# Patient Record
Sex: MALE | Race: WHITE
[De-identification: names, ages, dates, MRNs, and addresses within clinical notes are randomized per-mention and may not be internally consistent; named-entity substitution may affect disease eponyms.]

---

## 2021-05-25 ENCOUNTER — HOSPITAL ENCOUNTER (INPATIENT)
Dept: HOSPITAL 35 - 2N | Age: 66
LOS: 4 days | Discharge: NURSING, REHAB OR LONG-TERM CARE | DRG: 871 | End: 2021-05-29
Attending: HOSPITALIST | Admitting: HOSPITALIST
Payer: COMMERCIAL

## 2021-05-25 VITALS — WEIGHT: 229.99 LBS | HEIGHT: 68 IN | BODY MASS INDEX: 34.86 KG/M2

## 2021-05-25 VITALS — SYSTOLIC BLOOD PRESSURE: 111 MMHG | DIASTOLIC BLOOD PRESSURE: 74 MMHG

## 2021-05-25 DIAGNOSIS — G81.94: ICD-10-CM

## 2021-05-25 DIAGNOSIS — A41.9: Primary | ICD-10-CM

## 2021-05-25 DIAGNOSIS — F10.20: ICD-10-CM

## 2021-05-25 DIAGNOSIS — B96.89: ICD-10-CM

## 2021-05-25 DIAGNOSIS — Z20.822: ICD-10-CM

## 2021-05-25 DIAGNOSIS — G81.91: ICD-10-CM

## 2021-05-25 DIAGNOSIS — Z60.2: ICD-10-CM

## 2021-05-25 DIAGNOSIS — E78.5: ICD-10-CM

## 2021-05-25 DIAGNOSIS — Z80.0: ICD-10-CM

## 2021-05-25 DIAGNOSIS — F20.0: ICD-10-CM

## 2021-05-25 DIAGNOSIS — N39.0: ICD-10-CM

## 2021-05-25 DIAGNOSIS — R53.81: ICD-10-CM

## 2021-05-25 DIAGNOSIS — I10: ICD-10-CM

## 2021-05-25 DIAGNOSIS — F31.9: ICD-10-CM

## 2021-05-25 DIAGNOSIS — Z80.8: ICD-10-CM

## 2021-05-25 DIAGNOSIS — Z87.891: ICD-10-CM

## 2021-05-25 DIAGNOSIS — E11.42: ICD-10-CM

## 2021-05-25 DIAGNOSIS — D69.6: ICD-10-CM

## 2021-05-25 DIAGNOSIS — M31.9: ICD-10-CM

## 2021-05-25 DIAGNOSIS — E46: ICD-10-CM

## 2021-05-25 DIAGNOSIS — J45.909: ICD-10-CM

## 2021-05-25 DIAGNOSIS — I63.233: ICD-10-CM

## 2021-05-25 PROCEDURE — 10081 I&D PILONIDAL CYST COMP: CPT

## 2021-05-25 SDOH — SOCIAL STABILITY - SOCIAL INSECURITY: PROBLEMS RELATED TO LIVING ALONE: Z60.2

## 2021-05-25 NOTE — NUR
ASSUMED CARE FROM [facility], PT ALERT ORIENTED X4 CALM FAMILY AT
BEDSIDE NIH 0 AT THIS TIME PT PLACED ON CARDIAC MONITOR SHOWS NSR. DENIES
PAIN, REQUESTED FOOD . NP FROYLAN MARTINEZ AT BEDSIDE ASSESSING AND SOON TO WRITE
ORDERS.WILL PERFORM NIH ASSESSMENT AS ORDERD AND PRN.

## 2021-05-26 VITALS — SYSTOLIC BLOOD PRESSURE: 121 MMHG | DIASTOLIC BLOOD PRESSURE: 83 MMHG

## 2021-05-26 VITALS — DIASTOLIC BLOOD PRESSURE: 72 MMHG | SYSTOLIC BLOOD PRESSURE: 145 MMHG

## 2021-05-26 VITALS — DIASTOLIC BLOOD PRESSURE: 69 MMHG | SYSTOLIC BLOOD PRESSURE: 120 MMHG

## 2021-05-26 VITALS — SYSTOLIC BLOOD PRESSURE: 154 MMHG | DIASTOLIC BLOOD PRESSURE: 83 MMHG

## 2021-05-26 LAB
ANION GAP SERPL CALC-SCNC: 14 MMOL/L (ref 7–16)
BUN SERPL-MCNC: 23 MG/DL (ref 7–18)
CALCIUM SERPL-MCNC: 8.9 MG/DL (ref 8.5–10.1)
CHLORIDE SERPL-SCNC: 103 MMOL/L (ref 98–107)
CHOLEST SERPL-MCNC: 102 MG/DL (ref ?–200)
CO2 SERPL-SCNC: 20 MMOL/L (ref 21–32)
CREAT SERPL-MCNC: 1.2 MG/DL (ref 0.7–1.3)
ERYTHROCYTE [DISTWIDTH] IN BLOOD BY AUTOMATED COUNT: 14.1 % (ref 10.5–14.5)
GLUCOSE SERPL-MCNC: 174 MG/DL (ref 74–106)
HCT VFR BLD CALC: 40.6 % (ref 42–52)
HDLC SERPL-MCNC: 33 MG/DL (ref 40–?)
HGB BLD-MCNC: 14 GM/DL (ref 14–18)
LDLC SERPL-MCNC: 50 MG/DL (ref ?–100)
MAGNESIUM SERPL-MCNC: 1.6 MG/DL (ref 1.8–2.4)
MCH RBC QN AUTO: 32.4 PG (ref 26–34)
MCHC RBC AUTO-ENTMCNC: 34.5 G/DL (ref 28–37)
MCV RBC: 93.8 FL (ref 80–100)
PLATELET # BLD: 109 THOU/UL (ref 150–400)
POTASSIUM SERPL-SCNC: 3.5 MMOL/L (ref 3.5–5.1)
RBC # BLD AUTO: 4.33 MIL/UL (ref 4.5–6)
SODIUM SERPL-SCNC: 137 MMOL/L (ref 136–145)
TC:HDL: 3.1 RATIO
TRIGL SERPL-MCNC: 99 MG/DL (ref ?–150)
VLDLC SERPL CALC-MCNC: 20 MG/DL (ref ?–40)
WBC # BLD AUTO: 10.9 THOU/UL (ref 4–11)

## 2021-05-26 NOTE — NUR
UPON INITIAL ASSESSMENT OF PT THIS AM, HE WAS SHOWN TO HAVE AN NIH SCORE OF 5
WHICH WAS NEW COMPARED TO NIGHT RN'S REPORT. PROVIDER CONTACTED IMMEDIATELY
AND A STAT CT/MRI WAS COMPLETED, NEUROLOGY CONSULTED STAT(HAS NOT SEEN PT
YET). ECHO COMPLETE WAS DONE AT [facility]. BLOOD CULTURE RESULTS WERE OBTAINED
FROM [facility] AND RESULTS GIVEN TO PROVIDER. NEW BLOOD
CULTURES WERE ALSO DRAWN. MAGNESIUM REPLACED PER [facility]. PT AND FAMILY
HAVE BEEN THOUROUGHLY UPDATED AND EDUCATED ON PT CONDITION AND POC. PT NOT
PROGRESSING TOWARDS POC.

## 2021-05-26 NOTE — NUR
Met with patient who is A/Ox4 he reports he lives in home alone. No steps to
enter and all needs on one level. Patient reports pta indepedent with adls
pta. he uses a cane in community. Sp with Jennifer Shea, She reports she
is his only family. His son commited suicide at 15 years old. Patient admitted
with stroke like symptoms. Therapy evals in process. If patient with need for
acute rehab both patient and jennifer agreeable to [facility].

## 2021-05-26 NOTE — 2DMMODE
[facility]
[place]   [address]                   2 D/M-MODE ECHOCARDIOGRAM     
_______________________________________________________________________________
 
Name:       NAM GROSS AMENA              Room #:         202-P       ADM IN  
.#:      [id number]                       Account #:      [id number]  
Admission:  21    Attend Phys:    Pedro Ramon MD 
Discharge:              Date of Birth:  [birth year]  
                                                          Report #: [id number]
                                                                    [id number]
_______________________________________________________________________________
THIS REPORT FOR:  
 
cc:  FAM - Family physician unknown
     FAM - Family physician unknown
     Jose Zapata MD [facility]                                         
                                                                       ~
 
--------------- APPROVED REPORT --------------
 
 
Study performed:  2021 13:07:51
 
EXAM: Comprehensive 2D, Doppler, and color-flow 
Echocardiogram 
Patient Location: In-Patient   
Room #:  202     Status:  routine
 
      BSA:         2.18
HR: 66 bpm BP:          121/83 mmHg 
Rhythm: NSR     
 
Other Information 
Study Quality: Fair
 
Risk Factors: 
Cardiac Risk Factors:  HTN
 
Indications
Hypertension/HDD
 
2D Dimensions
RVDd:  33.82 mm   
IVSd:  13.61 (7-11mm)  LVOT Diam:  23.29 (18-24mm) 
LVDd:  41.37 mm   
PWd:  14.83 (7-11mm)  Ascending Ao:  34.62 (22-36mm)
LVDs:  29.24 (25-40mm)  
Left Atrium:  32.00 (27-40mm) 
Aortic Root:  35.94 mm  
 
Volumes
Left Atrial Volume (Systole) 
Single Plane 4CH:  29.30 mL Single Plane 2CH:  53.90 mL
Biplane LA Volume:  48.00 mL LA ESV Index:  22.00 mL/m2
 
Aortic Valve
AoV Peak Ede.:  1.76 m/s 
 
 
[facility]
[address]
[place]  [address]
Phone:  [phone]                    2 D/M-MODE ECHOCARDIOGRAM     
_______________________________________________________________________________
 
Name:            NAM GROSS              Room #:        202-P       [facility] [place]
[facility]#:           [id number]          Account #:     [id number]  
Admission:       21         Attend Phys:   Pedro Ramon,
Discharge:                  Date of Birth: [birth year]  
                         Report #:      [id number]
        [id number]
_______________________________________________________________________________
AO Peak Gr.:  12.36 mmHg LVOT Max P.91 mmHg
    LVOT Max V:  1.31 m/s
AKIL Vmax: 3.18 cm2  
 
Mitral Valve
    E/A Ratio:  0.8
    MV Decel. Time:  330.68 ms
MV E Max Ede.:  0.48 m/s 
MV A Ede.:  0.61 m/s  
MV PHT:  95.90 ms  
MVA (PHT):  1.86 cm2  
IVRT:  32.30 ms   
 
Pulmonary Valve
PV Peak Ede.:  1.14 m/s PV Peak Gr.:  5.24 mmHg
 
Pulmonary Vein
P Vein S:    0.42 m/s P Vein A:  0.31 m/s
P Vein D:   0.62 m/s P Vein A Dur.:  101.5 msec
P Vein S/D Ratio:  0.68 
 
Tricuspid Valve
TR Peak Ede.:  2.11 m/s  RAP Estimate:  7.00 mmHg
TR Peak Gr.:  17.82 mmHg RVSP:  25.00 mmHg
 
Left Ventricle
The left ventricle is normal size. There is normal LV segmental wall 
motion. Mild concentric left ventricular hypertrophy. Left 
ventricular systolic function is normal. The left ventricular 
ejection fraction is within the normal range. LVEF is 50-55%. 
Transmitral Doppler flow pattern suggests impaired LV 
relaxation.
 
Right Ventricle
The right ventricle is normal size. The right ventricular systolic 
function is normal.
 
Atria
The left atrium size is normal. Injection of bubbles is not 
conclusive due to poor image quality. The right atrium size is 
normal.
 
Aortic Valve
The aortic valve is normal in structure. No aortic regurgitation is 
present. There is no aortic valvular stenosis.
 
 
 
[facility]
[address] [place] [address]
Phone:  [phone]                    2 D/M-MODE ECHOCARDIOGRAM     
_______________________________________________________________________________
 
Name:            NAM GROSS AMENA              Room #:        202-P       [facility] [place]
..#:           [id number]          Account #:     [id number]  
Admission:       21         Attend Phys:   Pedro Ramon,
Discharge:                  Date of Birth: [birth year]  
                         Report #:      [id number]
        [id number]
_______________________________________________________________________________
Mitral Valve
The mitral valve is normal in structure. Trace mitral regurgitation. 
No evidence of mitral valve stenosis.
 
Tricuspid Valve
The tricuspid valve is normal in structure. Trace to mild tricuspid 
regurgitation. PAP 25 mmHg
 
Pulmonic Valve
The pulmonary valve is normal in structure. There is no pulmonic 
valvular regurgitation.
 
Great Vessels
The aortic root is normal in size. IVC is normal in size and 
collapses >50% with inspiration.
 
Pericardium
There is no pericardial effusion. There is no pleural 
effusion.
 
<Conclusion>
Normal left ventricular size with mild concentric 
hypertrophy
Ejection fraction 55%
Grade 1 diastolic dysfunction
Normal right ventricular size/function
Normal atrial size
Color-flow Doppler study was performed of the 
aortic/mitral/tricuspid/pulmonary valve
Mild aortic valve calcification without stenosis
Normal mitral valve structure and function
Trace tricuspid valve insufficiency
Pulmonary systolic pressure estimated 25 mmHg
Normal aortic root size
No pericardial effusion
 
 
 
 
 
 
 
 
 
  <ELECTRONICALLY SIGNED>
   By: Jose Zapata MD, FACC    
  21     1433
D: 21 1433                           _____________________________________
T: 21 1433                           Jose Zapata MD, FACC      /INF

## 2021-05-27 VITALS — SYSTOLIC BLOOD PRESSURE: 135 MMHG | DIASTOLIC BLOOD PRESSURE: 83 MMHG

## 2021-05-27 VITALS — SYSTOLIC BLOOD PRESSURE: 117 MMHG | DIASTOLIC BLOOD PRESSURE: 75 MMHG

## 2021-05-27 VITALS — DIASTOLIC BLOOD PRESSURE: 61 MMHG | SYSTOLIC BLOOD PRESSURE: 105 MMHG

## 2021-05-27 VITALS — DIASTOLIC BLOOD PRESSURE: 83 MMHG | SYSTOLIC BLOOD PRESSURE: 168 MMHG

## 2021-05-27 VITALS — DIASTOLIC BLOOD PRESSURE: 72 MMHG | SYSTOLIC BLOOD PRESSURE: 147 MMHG

## 2021-05-27 LAB
ANION GAP SERPL CALC-SCNC: 15 MMOL/L (ref 7–16)
BILIRUB UR-MCNC: NEGATIVE MG/DL
BUN SERPL-MCNC: 24 MG/DL (ref 7–18)
CALCIUM SERPL-MCNC: 9.3 MG/DL (ref 8.5–10.1)
CHLORIDE SERPL-SCNC: 101 MMOL/L (ref 98–107)
CO2 SERPL-SCNC: 20 MMOL/L (ref 21–32)
COLOR UR: YELLOW
CREAT SERPL-MCNC: 1.2 MG/DL (ref 0.7–1.3)
ERYTHROCYTE [DISTWIDTH] IN BLOOD BY AUTOMATED COUNT: 13.2 % (ref 10.5–14.5)
EST. AVERAGE GLUCOSE BLD GHB EST-MCNC: 126 MG/DL
FINE GRAN CASTS #/AREA URNS LPF: (no result) /LPF
GLUCOSE SERPL-MCNC: 155 MG/DL (ref 74–106)
GLYCOHEMOGLOBIN (HGB A1C): 6 % (ref 4.8–5.6)
HCT VFR BLD CALC: 41.3 % (ref 42–52)
HGB BLD-MCNC: 14.1 GM/DL (ref 14–18)
KETONES UR STRIP-MCNC: (no result) MG/DL
MAGNESIUM SERPL-MCNC: 1.9 MG/DL (ref 1.8–2.4)
MCH RBC QN AUTO: 31.4 PG (ref 26–34)
MCHC RBC AUTO-ENTMCNC: 34.2 G/DL (ref 28–37)
MCV RBC: 91.7 FL (ref 80–100)
PLATELET # BLD: 101 THOU/UL (ref 150–400)
POTASSIUM SERPL-SCNC: 3.8 MMOL/L (ref 3.5–5.1)
RBC # BLD AUTO: 4.51 MIL/UL (ref 4.5–6)
RBC # UR STRIP: (no result) /UL
RBC #/AREA URNS HPF: (no result) /HPF
SODIUM SERPL-SCNC: 136 MMOL/L (ref 136–145)
SP GR UR STRIP: 1.02 (ref 1–1.03)
SQUAMOUS: (no result) /LPF (ref 0–3)
URINE CLARITY: CLEAR
URINE GLUCOSE-RANDOM*: NEGATIVE
URINE LEUKOCYTES-REFLEX: (no result)
URINE NITRITE-REFLEX: POSITIVE
URINE PROTEIN (DIPSTICK): (no result)
URINE WBC-REFLEX: (no result) /HPF (ref 0–5)
UROBILINOGEN UR STRIP-ACNC: 0.2 E.U./DL (ref 0.2–1)
WBC # BLD AUTO: 9.6 THOU/UL (ref 4–11)

## 2021-05-27 NOTE — NUR
Pt is working with therapy and f/u per neuro. [facility] acute rehab following along
and can accept once cleared medically. The pt has + blood cultures and now on
iv atb with picc line ordered today. Will follow.

## 2021-05-27 NOTE — HC
[facility]
[address]
[place]   [address]                     CONSULTATION                  
_______________________________________________________________________________
 
Name:       NAM GROSS              Room #:         202-P       ADM IN  
M.R.#:      [id number]                       Account #:      [id number]  
Admission:  21    Attend Phys:    Pedro Ramon MD 
Discharge:              Date of Birth:  [birth year]  
                                                          Report #: [id number]
                                                                    [id number] 
_______________________________________________________________________________
THIS REPORT FOR:  
 
cc:  FAM - Family physician unknown
     FAM - Family physician unknown                                       
     Timbo Christy DO                                        ~
 
DOC #: [id number]
TIMBO Christy DO
DATE OF SERVICE: 2021
 
PRIMARY ATTENDING:  Pedro Ramon MD
 
CONSULTING PSYCHIATRIST:  Timbo Christy DO
 
REASON FOR CONSULTATION:  Question of bipolar disorder psychiatric management in
setting of the patient admitted for acute stroke.
 
SOURCE OF INFORMATION:  Chair-side interview with the patient, conversations 
with Dr. Ramon and Dr. Murphy about the case.  Telephone conversation with 
his niece and healthcare Audrey SANTIAGO.
 
CHIEF COMPLAINT:  Unspecified.
 
HISTORY OF PRESENT ILLNESS:  This is a 66-year-old obese  male who was 
transferred yesterday from [facility] after experiencing a fall.  His
niece and nephew were at bedside to help report.
 
The fall happened approximately 9:30 a.m., but they did not call for help until 
11:45 a.m.  Niece sent over an ambulance at that point.   He refused care and 
then she presented to his residence where he was showing signs of a stroke with 
left-sided weakness in the upper and lower extremities, slurred speech and 
altered mental status.  He reports that he fell out of bed this morning, 
ambulated with weakness and dizziness, denies hitting his head or any other 
injuries.  Reports having difficulty using the phone to call for help.  At [facility], he had a CT of his head, which showed diffuse cerebral atrophy 
and evidence of an old right parietal occipital infarct, patchy decreased 
density in the cerebral white matter, especially on the right with possible 
acute subacute infarct on the right.  It is not entirely excluded, made a 
followup CT of the head and neck, which showed complete occlusion of the right 
internal carotid artery.  The carotid bifurcation approximately 50% stenosis at 
the origin of the left internal carotid artery, and this is completely patent, 
noted good opacification of the anterior, middle and posterior cerebral arteries
bilaterally indicating cross filling right to left via intact [place] of [place]. 
Additionally, he was found to have a urinary tract infection and was treated 
with Rocephin, gentamicin and tobramycin.
 
 
 
 
[address]   [address]                     CONSULTATION                  
_______________________________________________________________________________
 
Name:       NAM GROSS              Room #:         202-P       [facility] [place]  
[facility]#:      [id number]                       Account #:      [id number]  
Admission:  21    Attend Phys:    Pedro Ramon MD 
Discharge:              Date of Birth:  [birth year]  
                                                          Report #: [id number]
                                                                    [id number] 
_______________________________________________________________________________
 
PAST MEDICAL HISTORY:  Diabetes type 2 and hyperlipidemia.  Hypertension, 
alcoholism, peripheral neuropathy.
 
PAST PSYCHIATRIC HISTORY:  Included bipolar disorder and schizophrenia.
 
PAST SURGICAL HISTORY:  Tonsillectomy.
 
SOCIAL HISTORY:  Former alcoholic, sober one year, the niece moved down from 
[place] a year ago and she states that he has been sober on naltrexone.  Current 
everyday smoker, smokes half pack of cigarettes a day with a 50-pack-year 
history of smoking.  Denies recreational drug use.  Lives in independent living.
 
FAMILY HISTORY:  Positive for colon and liver cancer.
 
REVIEW OF SYSTEM:  Hospitalist noted a 12-point review of systems was negative 
except for the left-sided weakness.  On interview at bedside, the patient denied
SI, HI, auditory, visual, or tactile hallucinations. 
 
 He reports that the 
Vraylar has been a psychiatric medication.  He told me he saw a psychiatric 
provider.  His niece clarified this is her primary care provider.
 
ADDITIONAL SOCIAL HISTORY:  His wife is living, but has an early onset dementia.
 She resides in [place].  He reports, however, he had a son  when he was young 
at 28 years of age of suicide.  The patient reports he has been retired about 15
years.
 
CURRENT MEDICATIONS:  In the hospital, Lovenox 40 mg subQ at bedtime, aspirin 81
mg oral daily, propranolol 20 mg oral b.i.d., naltrexone 50 mg daily, 40 mg p.o.
daily lisinoopril, famotidine 20 mg daily, Neurontin 100 mg oral 3 times a day. 
He is getting Rocephin 1 gram IV q. 24 hours.
 
MICROBIOLOGY:  This admission, 2 blood cultures were done.  They are pending.
 
LABORATORY DATA:  Here at [facility]:  H and H 14.0 and 40.6, white 
count 10.9, platelet count 109.  Chemistries showed sodium 137, potassium 3.5, 
chloride 103, bicarbonate 20, anion gap 14, BUN 23, creatinine 1.2, estimated 
GFR 60, glucose 174, calcium 8.9, magnesium 1.6, triglycerides 99, cholesterol 
102, LDL 50, HDL 33, TSH 0.834.
 
DIAGNOSTIC DATA:  MRI here at [facility] showed acute CVA involving high right 
frontal region involving the posterior right parietal region, age-related 
findings include mild cerebral and cerebellar volume loss and mild chronic 
central white matter microvascular ischemia read by Dr. Forte.
 
 
 
 
[address]   [address]                     CONSULTATION                  
_______________________________________________________________________________
 
Name:       NAM GROSS              Room #:         202-P       [facility] [place]  
[facility]#:      [id number]                       Account #:      [id number]  
Admission:  21    Attend Phys:    Pedro Ramon MD 
Discharge:              Date of Birth:  [birth year]  
                                                          Report #: [id number]
                                                                    [id number] 
_______________________________________________________________________________
 
PHYSICAL EXAMINATION:
VITAL SIGNS:  Temperature 39.3, pulse 71, respirations 16, /70, O2 sat 
95%.
MUSCULOSKELETAL:  Seated in chair at bedside, reports left-sided weakness, 
hooked up to IV, did not appear in pain, did appear somewhat exhausted.
 
MENTAL STATUS EXAMINATION:  This is a well-developed, obese  male 
appearing slightly older than stated age.  Attention fair.  Concentration fair. 
Speech normal rate, volume, tone.  Thought process, linear and goal directed.  
Thought content, focused on his present somatic state. mood/affect-
congruent, constricted
Denied SI, HI.  Denied
auditory, visual, or tactile hallucinations.  Denied hopelessness, helplessness.
 Memory not formally tested.  Insight limited.  Judgment limited.  Fund of 
knowledge average range.
 
FORMULATION AND PLAN:  A 66-year-old  male presenting with acute 
stroke, transferred from [facility].  The patient is not an operative
candidate for carotid endarterectomy.  Also, the patient is being evaluated for 
acute inpatient rehabilitation.
 
DIAGNOSES:  At this time, bipolar 1 disorder by history, substance use disorder 
for alcohol at least moderate degree.  Recent right-sided CVA.  Numerous 
medical problems including diabetes, hypertension and obesity.
 
RECOMMENDATIONS:
1.  The patient actually did not know why he was on naltrexone, did not 
recognize he was taking it and the niece states he is on it for prevention of 
alcohol relapse and it is working.  I agree with it continuing.
2.  The patient takes Vraylar, which is cariprazine according to him 5 mg oral 
daily.  I called Dr. Ramon, related this to him. It is a non-formulary 
medication, so the niece will have to bring it in. She is visiting [facility], so 
that we will take care of that.
3.  The patient was oriented to month, year, day and date, did not know who the 
 was.  He has had a speech therapy cognitive 
evaluation with some mild to moderate deficits .  I am concerned that given 
the recent CVA, there could be a vascular dementia evolving.  Certainly, if the 
patient goes to our acute rehabilitation unit at [facility], neurobehavioral 
status exam will be included by Dr. Curry.
If not, my recommendation would be
some further testing.  I would have some concerns about the patient returning in
independent living if he is not screened appropriately for his ability to do so.
At this point, I do not see a reason why psychiatry will need to follow the 
patient further.  I think that the medical issues and physical rehabilitation 
have priority, so in summary, I would restart his cariprazine, naltrexone can 
 
 
 
[facility]
[address] [place]   [address]                     CONSULTATION                  
_______________________________________________________________________________
 
Name:       HOMARCANAM FREY              Room #:         202-P       ADM IN  
M.R.#:      [id number]                       Account #:      [id number]  
Admission:  21    Attend Phys:    Pedro Ramon MD 
Discharge:              Date of Birth:  [birth year]  
                                                          Report #: [id number]
                                                                    [id number] 
_______________________________________________________________________________
 
continue and we would screen him for potentially needing placement.
 
Time spent on this case is good 45 minutes, greater than 50% on review of 
records, coordination of care.
 
DO DANIELLE Gonzales/PINO/CHRIS
 
D: 2021 07:22 PM
T: 2021 12:23 AM
 
 
 
 
 
 
 
 
 
 
 
 
 
 
 
 
 
 
 
 
 
 
 
 
 
 
 
 
 
 
 
 
 
  <ELECTRONICALLY SIGNED>
   By: Timbo Christy DO        
  21     1032
D: 21 1822                           _____________________________________
T: 21 2323                           Timbo Christy DO          /nt

## 2021-05-27 NOTE — NUR
ASSESSMENT:
PT REMAIN ALERT AND ORIENT TIMES THREE. SLOW TO RESPOND AT TIMES. SITTING UP
IN CHAIR AT SHIFT CHANGE. WAS ASSISTED TIMES THREE TO BED FOR US TO DO ALEXANDER
CAROTIDS.  LEFT SIDE VERY WEAK. NOT RESISTANT TO GRAVITY. NIECE WAS AT THE
BEDSIDE EARLIER WITH QUESTIONS ANSWERED BY THIS RN IN REGARDS TO PT. VSS,
AFEBRILE. SR PER MONITOR. IN BED FOR NOW, SAFETY MEASURES MAINTAINED. NIH
SCORE 4-5.  TOLERATING PO INTAKE, NO INSULIN GIVEN THIS SHIFT. SLOW PROGRESS
TOWARDS DC GOALS. WILL CONTINUE TO MONITOR.

## 2021-05-27 NOTE — NUR
ASSESSMENT AS CHARTED -  MEDS AS PER MAR -  CHRISSIE DIET AND FLUIDS.  UP TO THE
CHAIR WITH THE ASSISTANCE OF PHYS THERAPY.  PT STARTED ON VANC AS ORDERED.  UA
SENT TO LAB AS ORDERED.  PT WITH NO CO'S OF PAIN OR NAUSEA.  L ARM/ LEG WEAKER
THAN RIGHT.  TO BE SEEN BY INFECTIOUS DISEASE AND ONC IN CONSULT.  PT TO MAYBE
TRANSFER TO REHAB TOMORROW.  ACCUCHECKS AS CHARTED COVERED PER [facility]SI PRN.  PT HE
NOTED TO BE IN THE 50'S THIS AFTERNOON.  PT WITH NO CO'S AT THE PRESENT TIME.

## 2021-05-28 VITALS — DIASTOLIC BLOOD PRESSURE: 65 MMHG | SYSTOLIC BLOOD PRESSURE: 107 MMHG

## 2021-05-28 VITALS — SYSTOLIC BLOOD PRESSURE: 120 MMHG | DIASTOLIC BLOOD PRESSURE: 68 MMHG

## 2021-05-28 VITALS — SYSTOLIC BLOOD PRESSURE: 145 MMHG | DIASTOLIC BLOOD PRESSURE: 87 MMHG

## 2021-05-28 VITALS — DIASTOLIC BLOOD PRESSURE: 76 MMHG | SYSTOLIC BLOOD PRESSURE: 130 MMHG

## 2021-05-28 VITALS — SYSTOLIC BLOOD PRESSURE: 137 MMHG | DIASTOLIC BLOOD PRESSURE: 80 MMHG

## 2021-05-28 LAB
ANION GAP SERPL CALC-SCNC: 13 MMOL/L (ref 7–16)
APTT BLD: 25.6 SECONDS (ref 24.5–32.8)
BUN SERPL-MCNC: 23 MG/DL (ref 7–18)
CALCIUM SERPL-MCNC: 9.2 MG/DL (ref 8.5–10.1)
CHLORIDE SERPL-SCNC: 104 MMOL/L (ref 98–107)
CO2 SERPL-SCNC: 21 MMOL/L (ref 21–32)
CREAT SERPL-MCNC: 1.1 MG/DL (ref 0.7–1.3)
ERYTHROCYTE [DISTWIDTH] IN BLOOD BY AUTOMATED COUNT: 13.8 % (ref 10.5–14.5)
FOLATE SERPL-MCNC: 5.6 NG/ML (ref 8.6–58.9)
GLUCOSE SERPL-MCNC: 123 MG/DL (ref 74–106)
HCT VFR BLD CALC: 38.9 % (ref 42–52)
HGB BLD-MCNC: 13.4 GM/DL (ref 14–18)
INR PPP: 0.99
IRON SERPL-MCNC: 22 UG/DL (ref 65–175)
MAGNESIUM SERPL-MCNC: 2 MG/DL (ref 1.8–2.4)
MCH RBC QN AUTO: 31.9 PG (ref 26–34)
MCHC RBC AUTO-ENTMCNC: 34.4 G/DL (ref 28–37)
MCV RBC: 92.6 FL (ref 80–100)
PLATELET # BLD: 119 THOU/UL (ref 150–400)
POTASSIUM SERPL-SCNC: 3.8 MMOL/L (ref 3.5–5.1)
PROTHROMBIN TIME: 10.8 SECONDS (ref 10.5–12.1)
RBC # BLD AUTO: 4.2 MIL/UL (ref 4.5–6)
SAO2 % BLD FROM PO2: 11 % (ref 20–39)
SODIUM SERPL-SCNC: 138 MMOL/L (ref 136–145)
TIBC SERPL-MCNC: 198 UG/DL (ref 250–450)
VIT B12 SERPL-MCNC: 229 PG/ML (ref 193–986)
WBC # BLD AUTO: 6.7 THOU/UL (ref 4–11)

## 2021-05-28 NOTE — NUR
ASSESSMENT AS CHARTED - MEDS AS PER MAR -  CHRISSIE DIET AND FLUIDS.  UP TO THE
CHAIR FOR MOST OF THE DAY.  SEEN BY PHYS THERAPY AND OCC THERAPY.  CT SCAN
COMPLETED THIS AM.  NO CO'S OF PAIN OR NAUSEA.  PT TO HAVE VANC TROUGH DRAWN
THIS EVEING PRIOR TO DOSE.  PT WITH NO CO'S AT THE PRESENT TIME.

## 2021-05-28 NOTE — NUR
PT IS ALERT AND OREIENTED X4. LUNGS ARE CLEAR ON ROOM AIR. NIH STROKE SCORE IS
A 4. SLIGHT TREAMORS IN HANDS. ABDOEN IS ROUND AND SOFT BOWEL
SOUNDSHYPOACTIVE. WATCHING SOME TV THIS EVENING. NEUROLGY SEEN PT THIS EVENING
AND ROUNDED ON PT . PT HAS A CONDOM CATH ON FOR URINE COLLECTION. CALL LIGHT
WITHIN REACH IF NEEDS FURHTER ASSISTANCE.

## 2021-05-28 NOTE — NUR
PT ACCEPTED TO ADMIT WHEN READY FOR DISCHARGE. ANTICIPATE WEEKEND ADMISSION
REHAB LIAISON IN [facility] [phone].

## 2021-05-28 NOTE — NUR
PER INSURANCE CO PT IS OUT OF NETWORK. COST TO PT WOULD BE 30% PLUS DEDUCTABLE
INCLUDING MEDICARE AS SECONDARY PAYER. IN NETWORK 100% COVERED AND DEDUCTABLE
IS MET. CONVEYED TO CM TO DISCUSS WITH PT. IF PT STILL WOULD LIKE TO COME TO
[facility] WILL CALL FOR AUTHORIZATION. WEEKEND REHAB LIASON IS KELSEY ALMAZAN [phone]

## 2021-05-28 NOTE — NUR
Case discussed with the care team. [facility] acute rehab has accepted the pt once
medically cleared for dc and they have a bed available this weekend if ready.
Pt with fevers, on iv atb per ID, and ongoing f/u per neuro.

## 2021-05-29 ENCOUNTER — HOSPITAL ENCOUNTER (INPATIENT)
Dept: HOSPITAL 35 - 2N | Age: 66
LOS: 13 days | Discharge: HOME HEALTH | DRG: 56 | End: 2021-06-11
Attending: PHYSICAL MEDICINE & REHABILITATION | Admitting: PHYSICAL MEDICINE & REHABILITATION
Payer: COMMERCIAL

## 2021-05-29 VITALS — DIASTOLIC BLOOD PRESSURE: 62 MMHG | SYSTOLIC BLOOD PRESSURE: 114 MMHG

## 2021-05-29 VITALS — DIASTOLIC BLOOD PRESSURE: 54 MMHG | SYSTOLIC BLOOD PRESSURE: 94 MMHG

## 2021-05-29 VITALS — WEIGHT: 230 LBS | HEIGHT: 67.99 IN | BODY MASS INDEX: 34.86 KG/M2

## 2021-05-29 VITALS — DIASTOLIC BLOOD PRESSURE: 67 MMHG | SYSTOLIC BLOOD PRESSURE: 118 MMHG

## 2021-05-29 VITALS — SYSTOLIC BLOOD PRESSURE: 146 MMHG | DIASTOLIC BLOOD PRESSURE: 89 MMHG

## 2021-05-29 DIAGNOSIS — D69.6: ICD-10-CM

## 2021-05-29 DIAGNOSIS — E53.8: ICD-10-CM

## 2021-05-29 DIAGNOSIS — F20.9: ICD-10-CM

## 2021-05-29 DIAGNOSIS — Z87.891: ICD-10-CM

## 2021-05-29 DIAGNOSIS — F41.9: ICD-10-CM

## 2021-05-29 DIAGNOSIS — Z86.16: ICD-10-CM

## 2021-05-29 DIAGNOSIS — E11.42: ICD-10-CM

## 2021-05-29 DIAGNOSIS — R13.10: ICD-10-CM

## 2021-05-29 DIAGNOSIS — B96.89: ICD-10-CM

## 2021-05-29 DIAGNOSIS — J45.909: ICD-10-CM

## 2021-05-29 DIAGNOSIS — I10: ICD-10-CM

## 2021-05-29 DIAGNOSIS — E55.9: ICD-10-CM

## 2021-05-29 DIAGNOSIS — I69.354: Primary | ICD-10-CM

## 2021-05-29 DIAGNOSIS — I65.23: ICD-10-CM

## 2021-05-29 DIAGNOSIS — Z79.899: ICD-10-CM

## 2021-05-29 DIAGNOSIS — F10.10: ICD-10-CM

## 2021-05-29 DIAGNOSIS — I63.9: ICD-10-CM

## 2021-05-29 DIAGNOSIS — E78.5: ICD-10-CM

## 2021-05-29 DIAGNOSIS — N39.0: ICD-10-CM

## 2021-05-29 DIAGNOSIS — R33.9: ICD-10-CM

## 2021-05-29 DIAGNOSIS — A41.9: ICD-10-CM

## 2021-05-29 DIAGNOSIS — F32.9: ICD-10-CM

## 2021-05-29 DIAGNOSIS — Z79.82: ICD-10-CM

## 2021-05-29 PROCEDURE — 10112: CPT

## 2021-05-29 NOTE — NUR
PT ADMITED FROM [address] [place] [address]. ADMISSION HX AND ASSESSMENT COMPLETED. VSS. CONSULT
CALLED. DR. SOTOMAYOR NOTIFIED. PT ORIENTED TO THE ROOM AND THE CALL LIGHT
SYSTEM. FALL PRECAUTION IMPLEMENTED. NO CONCERNS AT THIS TIME.

## 2021-05-29 NOTE — NUR
SLEPT MOST OF SHIFT. ASSIST TO TURN FOR COMFORT AND SKIN CARE. BRIEF NIH
SCORES 3 WITH RIGHT SIDE FACIAL DROOP AND LEFT ARM DRIFT. REMAINS A/O X4.
DENIES COMPLAINTS OF PAIN OR SHORTNESS OF AIR. SWALLOWS PILLS AND REGULAR
LIQUIDS WITH DIFFICULTY. WORKING ON GOALS AND PLAN OF CARE FOR NOC.
PROGRESSING TOWARDS GOALS FOR TRANSFER TO REHAB. CONTINUE TO ASSES CLOSELY.

## 2021-05-30 VITALS — DIASTOLIC BLOOD PRESSURE: 77 MMHG | SYSTOLIC BLOOD PRESSURE: 141 MMHG

## 2021-05-30 VITALS — DIASTOLIC BLOOD PRESSURE: 73 MMHG | SYSTOLIC BLOOD PRESSURE: 134 MMHG

## 2021-05-30 LAB
ANION GAP SERPL CALC-SCNC: 13 MMOL/L (ref 7–16)
BUN SERPL-MCNC: 22 MG/DL (ref 7–18)
CALCIUM SERPL-MCNC: 9.2 MG/DL (ref 8.5–10.1)
CHLORIDE SERPL-SCNC: 110 MMOL/L (ref 98–107)
CO2 SERPL-SCNC: 21 MMOL/L (ref 21–32)
CREAT SERPL-MCNC: 1.1 MG/DL (ref 0.7–1.3)
ERYTHROCYTE [DISTWIDTH] IN BLOOD BY AUTOMATED COUNT: 14.1 % (ref 10.5–14.5)
GLUCOSE SERPL-MCNC: 126 MG/DL (ref 74–106)
HCT VFR BLD CALC: 37.2 % (ref 42–52)
HGB BLD-MCNC: 12.4 GM/DL (ref 14–18)
MCH RBC QN AUTO: 31.4 PG (ref 26–34)
MCHC RBC AUTO-ENTMCNC: 33.5 G/DL (ref 28–37)
MCV RBC: 93.9 FL (ref 80–100)
PLATELET # BLD: 152 THOU/UL (ref 150–400)
POTASSIUM SERPL-SCNC: 3.5 MMOL/L (ref 3.5–5.1)
RBC # BLD AUTO: 3.96 MIL/UL (ref 4.5–6)
SODIUM SERPL-SCNC: 144 MMOL/L (ref 136–145)
WBC # BLD AUTO: 6.3 THOU/UL (ref 4–11)

## 2021-05-30 NOTE — HC
[facility]
Rosangela Borrero
[place]   [address]                     CONSULTATION                  
_______________________________________________________________________________
 
Name:       NAM GROSS              Room #:         202-P       [facility] [place]  
.R.#:      [id number]                       Account #:      [id number]  
Admission:  05/25/21    Attend Phys:    Pedro Ramon MD 
Discharge:  05/29/21    Date of Birth:  [birth year]  
                                                          Report #: [id number]
                                                                    [id number] 
_______________________________________________________________________________
THIS REPORT FOR:  
 
cc:  FAM - Family physician unknown
     FAM - Family physician unknown                                       
     Oswaldo Murphy MD                                            ~
 
DOC #: [id number]
Oswaldo Murphy MD
DATE OF SERVICE: 05/26/2021
 
We were asked to see he patient by the hospitalist.
 
HISTORY OF PRESENT ILLNESS:  The patient is a 66-year-old admitted in transfer 
from [facility] where he was seen for acute onset of weakness.  The patient 
states that he fell out of bed and he could not get up due to lower extremity 
weakness.  EMT was called and the patient was brought to the hospital.  After 
admission to [facility], the patient had CT angiogram.  This revealed complete 
occlusion of the right internal carotid and a 50% stenosis at the origin of the 
left internal carotid with good cross filling from across the [place].
 
Since admission to the hospital, the patient has had waxing and waning symptoms.
 When I saw the patient, he complained of some left-sided weakness.
 
PAST MEDICAL HISTORY:  Significant for diabetes mellitus, hypertension, 
dyslipidemia.  Other history includes bipolar disorder.
 
MEDICATIONS:  At home includes atorvastatin, vitamin D, fish oil, gabapentin, 
lisinopril, lorazepam, metformin, naltrexone, propranolol, Vraylar
 
ALLERGIES:  None known.
 
SOCIAL HISTORY:  The patient is a current smoker with a long history of tobacco 
use.  The patient lives in the [facility] by himself.
 
FAMILY HISTORY:  Positive for colon and liver cancer.
 
REVIEW OF SYSTEMS:  I agree with the review of systems as dictated by the 
hospitalist.
 
PHYSICAL EXAMINATION:
GENERAL:  The patient is sitting in a chair.  He is a bit drowsy, but arousable.
VITAL SIGNS:  Temperature 36.7, pulse rate 80, blood pressure 121/83, 
respiratory rate 18, O2 sat 100 on room air.
HEENT:  No scleral icterus.  No arcus.
NECK:  No mass.  I hear no bruit.
CHEST:  Clear to auscultation.
 
 
 
[address]   [address]                     CONSULTATION                  
_______________________________________________________________________________
 
Name:       NAM GROSS              Room #:         202-P       [facility] [place]  
M.R.#:      [id number]                       Account #:      [id number]  
Admission:  05/25/21    Attend Phys:    Pedro Ramon MD 
Discharge:  05/29/21    Date of Birth:  [birth year]  
                                                          Report #: [id number]
                                                                    [id number] 
_______________________________________________________________________________
 
HEART:  Rhythm regular, no murmur.
ABDOMEN:  Soft, protuberant.
EXTREMITIES:  No clubbing, cyanosis.  Trace edema bilaterally.  Radial pulses 
are 1+, popliteal pulses are 1+.
NEUROLOGIC:  The patient has weak left upper extremity and has a positive 
pronator drift sign.  In fact, he drifts on the left side before he closes his 
eyes, cannot hold his left palm up toward the ceiling spontaneously.  He has 
full motion in the right hand, but there is an intention tremor on the left 
side.  The fingers are weak, particularly in the lumbar distribution.  Lower 
extremities seems to have full motion and sensation, but I did not stand the 
patient and he does appear generally weak.
MUSCULOSKELETAL:  No bone or joint asymmetry or deformity.
SKIN:  No rash or infection.
PSYCHIATRIC:  Shows insight into problem and answers questions appropriately, 
but bit slowly.
 
ASSESSMENT AND PLAN:  The patient appears to have had some sort of a neurologic 
event.  This is more consistent with a completed stroke than with a stroke in 
evolution or transient ischemic attack, but time will tell, the occluded carotid
is certainly not a lesion that we would recommend treatment.  The contralateral 
stenosis does not appear to be hemodynamically significant, but duplex exam 
could corroborate velocity and give more insight if there is some concern.  At 
this point, the treatment may be focused on diagnosis of the deficits and 
rehabilitation.  It is certainly safe to add anticoagulation from my point of 
view, but a formal neurology consult may be advisable.
 
It is a privilege to participate in this challenging patient's care.  Thank you 
for the consult.
 
MD CAREN Hoang/MAR
 
D: 05/26/2021 12:25 PM
T: 05/26/2021 10:14 PM
 
 
 
 
 
 
 
 
 
  <ELECTRONICALLY SIGNED>
   By: Oswaldo Murphy MD            
  05/30/21     0744
D: 05/26/21 1125                           _____________________________________
T: 05/26/21 2114                           Oswaldo Murphy MD              /nt

## 2021-05-30 NOTE — NUR
PT PLEASANT AND COOPERATIVE WITH CARES. VSS. UP IN THE CHAIR THIS SHIFT. LEFT
SIDED WEAKNESS. NO CONCERNS AT THIS TIME. WILL CONTINUE WITH PLAN OF CARE.

## 2021-05-30 NOTE — NUR
ASSESSMENT:
PT REMAIN ALERT AND ORIENT TIMES THREE. FOLLOW SIMPLE COMMANDS. VSS, AFEBRILE.
CURRENTLY DENIES EAR ACHE, TYLENOL PRN IS AVAILABLE. CONDEM CATH INTACT. DK
YELLOW UO IN GAGNON BAG. . PT GIVEN 2 ORANGE JUICES PER REQUEST. RIGHT
HAND IV PATENT. SLOW PROGRESS TOWARDS DISCHARGE GOALS, WILL CONTINUE TO
MONITOR.

## 2021-05-31 VITALS — DIASTOLIC BLOOD PRESSURE: 76 MMHG | SYSTOLIC BLOOD PRESSURE: 136 MMHG

## 2021-05-31 VITALS — SYSTOLIC BLOOD PRESSURE: 145 MMHG | DIASTOLIC BLOOD PRESSURE: 76 MMHG

## 2021-05-31 NOTE — NUR
PATIENT HAS HAD A QUIET DAY. HE IS PLEASANT. UP WITH WALKER. GAIT FAIRLY
STEADY. NOW SLEEPING. WILLC ONT WITH PLAN OF CARE.

## 2021-05-31 NOTE — HC
[facility]
Rosangela Borrero
[place]   [address]                     CONSULTATION                  
_______________________________________________________________________________
 
Name:       NAM GROSS              Room #:         202-P       [facility] [place]  
M.R.#:      [id number]                       Account #:      [id number]  
Admission:  05/25/21    Attend Phys:    Pedro Ramon MD 
Discharge:  05/29/21    Date of Birth:  [birth year]  
                                                          Report #: [id number]
                                                                    [id number] 
_______________________________________________________________________________
THIS REPORT FOR:  
 
cc:  FAM - Family physician unknown
     FAM - Family physician unknown                                       
     Isaias Evans MD                                         ~
 
DOC #: [id number]
Isaias Evans MD
DATE OF SERVICE: 05/26/2021
 
HISTORY OF PRESENT ILLNESS:  This is a 66-year-old male patient on whom a 
routine consult was sent for altered mental status.  I saw this patient.  I 
talked to the nurse.  I talked to the patient.  In fact, today he looks 
reasonably clear in his mentation.  I called his niece who is listed as a 
durable power of  and she provided a history in this patient.  This 
patient looks like woke up with the weakness and neglect on the left side.  I 
reviewed the records from [facility].  It looks like that they had a 
consultation with neurologist by telemedicine and he was not found to be a TPA 
candidate because he woke up and it was more than 4.5 hours.  They had 
recommended do a CT angio and call if abnormal.  I do not know whether the 
patient was discussed with them again after the CT angio was abnormal.
 
The patient's INH here was reported as 0 according to the nurses, but the 
daytime nurse tells me that is not the case because when they took over what it 
was 5 and looks like that is weakness is going on since yesterday and time of 
onset of the stroke is day before. As I understand that vascular and 
cardiothoracic surgery has already seen this patient for a blocked artery and 
they do not plan to do anything in this patient because of the vessel is 
completely blocked on the right side.  The patient's biggest risk factor is 
stroke.
 
REVIEW OF SYSTEMS:  A 14-point review of system was carried out and is pretty 
extensive.  He has a pretty psychiatric problem.  He has paranoid schizophrenia,
history of diabetes, bipolar disorder, hyperlipidemia, schizophrenia, 
hypertension.  He used to drink very heavily until 2019 and he still smokes.  He
does not believe that he had any eye, ENT, cardiac, respiratory, GI, , 
musculoskeletal, constitutional, dermatological, hematological, and psychiatric,
throat, allergic symptom associated with present symptomatology.
 
PAST MEDICAL HISTORY:  Negative for stroke.  They were not aware of the fact 
that the patient had a right parietal stroke before.
 
FAMILY HISTORY:  Negative for early age stroke.
 
SOCIAL HISTORY:  Positive for smoking and drinking alcohol in 2019.
 
 
 
 
[facility]
[address]
[place]   [address]                     CONSULTATION                  
_______________________________________________________________________________
 
Name:       NAM GROSS              Room #:         202-P       [facility] [place]  
M.R.#:      [id number]                       Account #:      [id number]  
Admission:  05/25/21    Attend Phys:    Pedro Ramon MD 
Discharge:  05/29/21    Date of Birth:  [birth year]  
                                                          Report #: [id number]
                                                                    [id number] 
_______________________________________________________________________________
 
PHYSICAL EXAMINATION:  On examination, he is alert.  He is responsive.  He can 
tell me what month it is.  He could tell me the approximate date.  Speech looks 
intact.  His cranial nerve examination does show some slight left facial 
weakness, but the best I can tell, visual field looks intact, but that is 
difficult.  He is pretty significantly weak on the left side.  He does have some
antigravity movement.  He can tell me position sense on both sides.  Reflexes 
are somewhat diminished.  Tone looks symmetrical.  Cerebellar sign on the right 
side looks unremarkable.  I could not look at the patient's fundus.  His pulses 
looks palpable.  He has no edema, cyanosis or jaundice, no thyroid mass.  I do 
not hear any carotid bruit that much, but I cannot rule it out either because of
his thick neck.  His cardiac examination is unremarkable.  No respiratory 
difficulty was noticed.
 
His lab indicated the platelet count is somewhat on the lower side at 109.  His 
MRI was reviewed and it did confirm that he has an old and new stroke.  He was 
not on any aspirin or Plavix at home.
 
IMPRESSION:
1.  Right hemispheric recurrent stroke.
2.  Occlusion of the right carotid.
3.  Stenosis of the left carotid.
 
RECOMMENDATIONS:
1.  Combination of aspirin and Plavix for 21 days.  His platelet count is low, 
but I think we should still give him Plavix, but I will give him a dose of 300 
mg rather than 600 mg, but the 300 mg has been found to be effective.
2.  Left carotid need to be watched and naturally that Dr. Silva is already 
consulted and he is already addressing that issue.
3.  Running temperature. That need to be addressed and will defer to internist.
4.  He needs an echo.
5.  He needs 30 days event monitor as an outpatient.
6.  He needs a rehab consult.  That he will go after acute workup is complete.
 
Thank you very much for this referral and if you have any questions, please feel
free to contact me.
 
Isaias Evans MD
PK/HARSHA
 
D: 05/26/2021 04:48 PM
T: 05/26/2021 11:38 PM
 
 
  <ELECTRONICALLY SIGNED>
   By: Isaias Evans MD         
  05/31/21     2217
D: 05/26/21 1548                           _____________________________________
T: 05/26/21 2238                           Isaias Evans MD           /nt

## 2021-06-01 VITALS — DIASTOLIC BLOOD PRESSURE: 79 MMHG | SYSTOLIC BLOOD PRESSURE: 147 MMHG

## 2021-06-01 VITALS — DIASTOLIC BLOOD PRESSURE: 93 MMHG | SYSTOLIC BLOOD PRESSURE: 155 MMHG

## 2021-06-01 NOTE — NUR
PT ALERT AND ORIENTED TIMES FOUR. VSS. PT DENEIS PAIN/SOA. PT TOLERATES MEDS
AND MEALS. PT WORKS WELL WITH PT/OT. PT PROGRESSING TOWRADS POC GOALS.

## 2021-06-01 NOTE — NUR
Nutrition: pt admitted to rehab unit S/P acute Right CVA with left
hemiparesis. Received consult stating malnutrition-defer to physician. Pt with
no weight loss reported and overall good appetite. Observed > 75% lunch
consumed, average intake past few days > 60% of meals. BMI 35, obesity class
2.  On B12, vitamin D, folic acid.  Vitamin D pending. ST following for
dysphagia and need for mechanically altered diet. PMH/labs noted. Low risk

## 2021-06-01 NOTE — NUR
UP TO TOILET WITH GAIT BELT, WALKER, AND CONTACT GUARD ASSIST. EASILY SITTING
ON TOILET, WHICH HE DECIDED TO DO BECAUSE HE SPILLED ON THE FLOOR IN FRONT OF
TOILET DURING PREVIOUS TRIP. TOLERATING PILLS WITH WATER. BLOOD SUGAR 129 AT
HS, TAKES METFORMIN BID. FAMILY HERE AND CURIOUS ABOUT POSSIBLE BLOOD
INFECTION AND WHAT CHOICES HE CAN HAVE FOR MEALS.

## 2021-06-01 NOTE — HC
[facility]
Rosangela Borrero
[place]   [address]                     CONSULTATION                  
_______________________________________________________________________________
 
Name:       HOMARALBANNAM              Room #:         202-P       [facility] [place]  
M.R.#:      [id number]                       Account #:      [id number]  
Admission:  21    Attend Phys:    Pedro Ramon MD 
Discharge:  21    Date of Birth:  [birth year]  
                                                          Report #: [id number]
                                                                    [id number] 
_______________________________________________________________________________
THIS REPORT FOR:  
 
cc:  FAM - Family physician unknown
     FAM - Family physician unknown                                       
     Julio Finn MD                                  ~
 
DOC #: [id number]
 
cc:     Isaias Evans MD, Dr. Laverne Cheung, Pedro Ramon MD, Reuben Sanabria MD, Jami Farris, IZZY Finn MD
DATE OF SERVICE: 2021
 
REASON FOR CONSULTATION:  Thrombocytopenia.
 
HISTORY OF PRESENT ILLNESS:  The patient is a pleasant 66-year-old gentleman 
from the [facility] who reportedly the morning of admission on the , but it
may have been on the  I am not sure of the date, had fallen down and in 
retrospect had some left-sided weakness, had some altered mental status and 
trouble speaking.  He recently declined EMT evaluation, but reported his niece, 
Audrey Shea whose phone number is [phone] came over noticed the 
altered changes and he went to hospitals.  He was then transferred here.  
His imaging has showed some vascular abnormalities contributing to some 
right-sided brain damage.  He has also had evidence of old right infarct in the 
parietal he has gotten some better.  He is thought to be outside the timeframe 
of benefit from TPA.  Dr. Evans from Neurology had seen the patient, was 
planning on dual antiplatelet therapy, but was concerned about the safety given 
that his platelets were 109,000 on admission.  The patient has no prior 
knowledge of low platelet counts.  Audrey Shea, his legal guardian of 2 
years, has no knowledge of that either.  They have been stable and actually up 
to 119 today.  His white count has been around 10-9 with a fairly normal 
differential, hemoglobin has been stable during the same time.  Electrolytes 
have been fairly unremarkable.
 
At this time, the patient tells me he did have a small headache earlier today, 
better after he took a pill for it, not sure what it was.  He denies any 
swallowing troubles.  He does feel like his speech is a bit slow, thinking 
somewhat slow.  He does feel like he is a bit weak on his left side, but better.
 He does not feel like he is biting his tongue, having trouble swallowing or 
breathing.  He does not feel like he is having trouble with incontinence of 
urine or stool.  He is not aware of any skin rashes.  His appetite has been 
good.
 
PAST HISTORY:  Notable for history of alcoholism.  His niece said that he 
stopped drinking when she moved him here 2 years ago, also reports that he has 
been on ____.  She did say she caught him with a 6-pack of beer about a year 
 
 
 
[address]   [address]                     CONSULTATION                  
_______________________________________________________________________________
 
Name:       NAM GROSS              Room #:         202-P       [facility] [place]  
.R.#:      [id number]                       Account #:      [id number]  
Admission:  21    Attend Phys:    Pedro Ramon MD 
Discharge:  21    Date of Birth:  [birth year]  
                                                          Report #: [id number]
                                                                    [id number] 
_______________________________________________________________________________
 
ago.  She does not think he has been drinking since then.  Also reported history
of diabetes mellitus, hyperlipidemia, hypertension, peripheral neuropathy, 
possible carotid artery disease.  Also, there is a question of asthma, also a 
question of bipolar disorder, though that is not clear.
 
SOCIAL HISTORY:  He grew up in [place] and was in the  for about 16 
years, then he worked in [place], [place], if I recall and worked at 
[employer] as a  there for quite a few years.
 
PSYCHIATRIC:  He drank with a bunch of his buddies, I think that someone said 
his wife  about 16 years ago.  He currently lives in an apartment in [place].
 He does drive around town.  Reportedly, no drugs.  Still smokes maybe about 
half a pack a day for 50 years.  No street drugs.  His hobbies enjoys doing 
leather work.  He gets products online from [employer].  He does belts and billfolds 
and things of that type.  He also goes to the coffee shop and things like that.
 
PHYSICAL EXAMINATION:
GENERAL:  The patient appears his stated age.
VITAL SIGNS:  Height is 5 feet 8 inches, 172.7 cm, weight 233 pounds or 105.7 
kilograms.  Temperature was febrile at 100.3 axillary, O2 sat 96%, blood 
pressure 145/87, respirations 18, pulse 63.
HEENT:  Face appears symmetrical.
LUNGS:  Clear without notable rhonchi, wheezes or rales, is symmetric and 
unlabored.
ABDOMEN:  Obese.  No definite organomegaly or fluid wave.
EXTREMITIES:  Without clubbing, cyanosis.  Oropharynx without any petechiae or 
bleeding.
NEUROLOGIC:  Strength reported as slightly weak, though not assessed directly.
 
LABORATORY DATA:  As mentioned above showed the hemoglobin of 14, white count of
96, platelets 119.  Creatinine 1.2.  B12, I think was at the low end of normal 
229.  Folate was low at 5.6.  Imaging was consistent with right internal carotid
abnormalities and also, I think that might have been completely occluded.  Also,
partial occlusion of the left carotid.  As mentioned above, Vascular Surgery did
not think surgical intervention was warranted at this time.  Note the urine from
Danielle has grown gram-positive cocci.
 
MEDICATIONS:  At this time in the hospital currently include folic acid 1 mg 
began today, B12 500 mcg daily began today, clopidogrel 75 mg begun on the ,
vancomycin 1000 mg daily, Lovenox 40 at bedtime, Vraylar ____ mg daily, aspirin 
81 mg daily, naltrexone 50 mg daily, propranolol 20 b.i.d., famotidine 20 
b.i.d., sliding scale insulin, gabapentin 100 t.i.d., ceftriaxone 1 gram.  He is
also on p.r.n. Tylenol.
 
ASSESSMENT AND PLAN:
 
 
 
[address]   [address]                     CONSULTATION                  
_______________________________________________________________________________
 
Name:       NAM GROSS              Room #:         202-P       [facility] [place]  
.R.#:      [id number]                       Account #:      [id number]  
Admission:  21    Attend Phys:    Pedro Ramon MD 
Discharge:  21    Date of Birth:  [birth year]  
                                                          Report #: [id number]
                                                                    [id number] 
_______________________________________________________________________________
 
1.  Thrombocytopenia.  The patient with no prior knowledge, stable here, may be 
related to past alcohol.  We will check iron panel.  We will also ask for 
outside lab from Laverne Cheung to see if this has been stable.  We will also
check CT abdomen to see if he has any evidence of cirrhosis or liver damage from
past alcohol misuse, watch expectantly.  We will make the coags normal.
2.  History of alcohol misuse, reportedly drive for at least a year, if not 2 
years now.  The patient on ____ which reportedly has been helping him.
3.  Left-sided weakness and right-sided vascular abnormalities.  The patient on 
clopidogrel and aspirin.  I believe at this time, the potential benefit of 
discharge outweighs a small but real risk with a platelet count of 119,000.
4.  Diabetes.  Sliding scale insulin and dietary management.
5.  Hyperlipidemia.  Defer to others.
6.  Urinary tract infection with gram-positive cocci, antibiotics per Infectious
Disease.
7.  Hypertension, management per others.
8.  History of peripheral neuropathy, continues gabapentin.
9.  History of bipolar and other cognitive impairments.  Defer to others.
 
We will follow with you.
 
MD DAWN Sun/ROMARIO/ANTONIO
 
D: 2021 08:06 AM
T: 2021 09:27 AM
 
 
 
 
 
 
 
 
 
 
 
 
 
 
 
 
 
 
  <ELECTRONICALLY SIGNED>
   By: Julio Finn MD  
  21     0714
D: 21 0706                           _____________________________________
T: 21 0827                           Julio Finn MD    /nt

## 2021-06-01 NOTE — NUR
team meeting, BPCI. left side affect, mod dressing, fww with assist, mod
cog, sever memory, needs assist with bills and pills. no driving. re team with
anticipated dc on 6/16. see if niece can provided extra assist and support.
chart review. he lives alone, no steps. has cane. manage own bills and pills.

## 2021-06-02 VITALS — SYSTOLIC BLOOD PRESSURE: 154 MMHG | DIASTOLIC BLOOD PRESSURE: 88 MMHG

## 2021-06-02 VITALS — SYSTOLIC BLOOD PRESSURE: 155 MMHG | DIASTOLIC BLOOD PRESSURE: 90 MMHG

## 2021-06-02 VITALS — SYSTOLIC BLOOD PRESSURE: 124 MMHG | DIASTOLIC BLOOD PRESSURE: 66 MMHG

## 2021-06-02 VITALS — DIASTOLIC BLOOD PRESSURE: 67 MMHG | SYSTOLIC BLOOD PRESSURE: 120 MMHG

## 2021-06-02 VITALS — DIASTOLIC BLOOD PRESSURE: 74 MMHG | SYSTOLIC BLOOD PRESSURE: 143 MMHG

## 2021-06-02 VITALS — DIASTOLIC BLOOD PRESSURE: 66 MMHG | SYSTOLIC BLOOD PRESSURE: 124 MMHG

## 2021-06-02 NOTE — NUR
PT LYING IN BED, PT DIDN'T WANT TO EAT MUCH FOR DINNER, PT DID HAVE HIS
PUDDING AND A FEW BITES OF MEAT.

## 2021-06-02 NOTE — NUR
ASSISTED PT TO BATHROOM AND WHILE PT WAS WIPPING AFTER BM HE WAS LEANING TO
HIS LEFT AND FELL OFF TOILET IN SHOWER. PT DENIES HITTING HIS HEAD OR
DIZZINESS, PT STATED HE JUST LEANED TO WIPE AND FELL OFF TOILET. PT DIDN'T
APPEAR TO HAVE ANY INJURIES. VSS. PT ASSISTED BACK TO BED VIA WALKER. PT DID
HAVE SOFT BM.

## 2021-06-02 NOTE — NUR
PT LEANING ON HIS TABLE IN HIS ROOM. PT STATED HE WAS JUST TIRED. APPLIED LAB
SHARMILA AROUND HIM WITH VELCRO IN FRONT. PT ABLE IT REMOVE IF NEEDED. PT TOOK
MEDS A FEW AT A TIME WITH WATER.

## 2021-06-02 NOTE — NUR
PT WAS WORKING WITH ST THIS AM. WHEN GOING INTO ROOM FOR AM MEDS PT WAS
ALREADY RESTING WITH EYES CLOSED. NOTICED [place] GAGNON IN BATHROOM THAT HE PROB
HAD AT HS, PT HAS URINAL AT BEDSIDE. PT DIDN'T HAVE ANY ISSUES WITH BREATHING
WHILE SLEEPING.

## 2021-06-02 NOTE — NUR
UP TO TOILET WITH GAIT BELT, WALKER, AND CONTACT GUARD ASSIST. TAKING MEDS ONE
OR TWO AT A TIME WITH WATER. BLOOD SUGAR STEADY AT 97 EVENING AND HS. EASILY
TOLERATES TAKING PILLS ONE AT A TIME WITH WATER. TURNING SELF TO SIDE AND HAS
JUST ENOUGH STRENGTH TO PULL HIMSELF UP IN BED WHEN BED FLATTENED

## 2021-06-03 VITALS — DIASTOLIC BLOOD PRESSURE: 74 MMHG | SYSTOLIC BLOOD PRESSURE: 129 MMHG

## 2021-06-03 VITALS — DIASTOLIC BLOOD PRESSURE: 69 MMHG | SYSTOLIC BLOOD PRESSURE: 135 MMHG

## 2021-06-03 NOTE — NUR
6-2-21 CARE TRANSFERRED 1900 OBSERVED PT RESTING IN BED WITH EYES CLOSED.
LATER PT AAOX4, VSS, RR EVEN AND NONLABORED ON RA, PT LUNGS CLEAR AND
DIMINISHED, HT RR, ABD ACTIVE AND SOFT. PT HAS BEEN USING URINAL WITH NO
DIFFICULTIES 750ML YELLOW, SLIGHT SEDIMENT AND STRONG ODOR. DURING MEDICATION
ADMIN PT WAS ABLE TO REPOSITION SELF FROM LAYING TO SITTING, WITH NO
DIFFICULTIES, PT HAD NO DIFFICULTIES TAKING MEDICATION ONE AT A TIME WITH
WATER. LATER PT WAS REPOSITION FOR COMFORT. ZERO S/S OF ACUTE DISTRESS NOTED,
PT WILL CONTINUE TO BE MONITOR PER 5NR PROTOCOL.

## 2021-06-03 NOTE — NUR
Alert and orientated X4.  Calm, cooperative and compliant.  Took meds whole
with water.  Breath sounds clear.  Reg HR auscultated.  Color pink with brisk
capillary refill and palpable peripheral pulses.  Yellow urine per toilet.
Active bowel sounds over soft, rounded abdomen.  Reports BM yesterday.
Ambulates with walker with steady gait.  BG changed to BID per order.

## 2021-06-04 VITALS — SYSTOLIC BLOOD PRESSURE: 120 MMHG | DIASTOLIC BLOOD PRESSURE: 77 MMHG

## 2021-06-04 VITALS — DIASTOLIC BLOOD PRESSURE: 48 MMHG | SYSTOLIC BLOOD PRESSURE: 98 MMHG

## 2021-06-04 NOTE — NUR
Assumed care on 06/03/21 @ 19:15, asleep at the beginning of shift. Takes meds
whole with water.  Breath sounds clear bilat, HRRR, Active bowel sounds 4Q.
Ambulates with walker and gait belt x1 assist, Carter score 85, safety
precautions observed. Urine output via Toilet,
yellow urine noted. Respirations even and unlabored on room air.
Resting in bed with eyes closed, insists on leaving overhead
lights turned on during the night.

## 2021-06-04 NOTE — NUR
ASSUMED CARE AT 0700. SLEPT FAIRLY WELL. ALERT AND ORIENTATED X 4. WAS GIVEN
PAIN MEDS AT EARLIER IN THE SHIFT FOR L KNEE PAIN AND REPORTED RELIEF. UP WITH
MIN ASSIST USING THE CANE. BLOOD SUGAR CHECKED. APPETITE GOOD. DIURESING ADEQ.
LAST BM ON 6/2. PARTICIPATING WITH THERAPY AND PROGRESSING TOWARDS GOAL.

## 2021-06-05 VITALS — SYSTOLIC BLOOD PRESSURE: 145 MMHG | DIASTOLIC BLOOD PRESSURE: 76 MMHG

## 2021-06-05 VITALS — DIASTOLIC BLOOD PRESSURE: 53 MMHG | SYSTOLIC BLOOD PRESSURE: 116 MMHG

## 2021-06-05 NOTE — NUR
PT SITTING ON SIDE OF BED EATING BREAKFAST. PT HAS WEAKNESS TO LEFT HAND. PT
UP WITH ASSIST WITH WALKER. PT DENIES ANY PAIN. PT LUNGS CLEAR. PT LBM 06/04.
PT LUNGS ARE CLEAR. PT TAKES MEDS WHOLE. PT ALSO HAS ISSUES WITH TREMORS HE
STATED HE HAS HAD A WHILE.

## 2021-06-05 NOTE — NUR
PT ASSESSMENT COMPLETED AND VSS. MEDS GIVEN AS ORDERED AND WELL TOLERATED.
FALL PRECUATIONS IN PLACE. ASST WITH REPOSITION FOR COMFORT. SLEEPING WELL.
WILL CONTINUE TO MONITOR FREQUENTLY.

## 2021-06-06 VITALS — SYSTOLIC BLOOD PRESSURE: 97 MMHG | DIASTOLIC BLOOD PRESSURE: 49 MMHG

## 2021-06-06 VITALS — SYSTOLIC BLOOD PRESSURE: 121 MMHG | DIASTOLIC BLOOD PRESSURE: 59 MMHG

## 2021-06-06 NOTE — NUR
PT ASSESSMENT COMPLETED AND VSS. MEDS GIVEN AS ORDERED AND WELL TOLERATED.
FALL PRECAUTIONS IN PLACE. UP TO THE BATHROOM WITH ASST/GAIT/WALKER. STEADY.
VOIDING MODERATE AMOUNT OF YELLOW URINE. TYLENOL HELPFUL FOR GENERALIZED PAIN.
SLEEPING WELL. WILL CONTINUE TO MONITOR FREQUENTLY.

## 2021-06-06 NOTE — NUR
PT SITTING ON SIDE OF BED EATING BREAKFAST. PT STATED HE DIDN'T HAVE ANY
CONCERNS. PT ASKING WHAT DAY THE 16 IS. PT STATED THAT IS HIS DISCHARGE DATE.
PT STATED HE FEELS LIKE HIS LEFT HAND IS GETTING STRONGER. PT UP WITH WALKER
WITH STAND-BY ASSIST. PT LUNGS CLEAR. PT DENIES ANY PAIN AT THIS TIME.

## 2021-06-07 VITALS — DIASTOLIC BLOOD PRESSURE: 63 MMHG | SYSTOLIC BLOOD PRESSURE: 117 MMHG

## 2021-06-07 VITALS — DIASTOLIC BLOOD PRESSURE: 76 MMHG | SYSTOLIC BLOOD PRESSURE: 133 MMHG

## 2021-06-07 LAB
ANION GAP SERPL CALC-SCNC: 14 MMOL/L (ref 7–16)
BASOPHILS NFR BLD AUTO: 0.5 % (ref 0–2)
BUN SERPL-MCNC: 12 MG/DL (ref 7–18)
CALCIUM SERPL-MCNC: 8.8 MG/DL (ref 8.5–10.1)
CHLORIDE SERPL-SCNC: 108 MMOL/L (ref 98–107)
CO2 SERPL-SCNC: 23 MMOL/L (ref 21–32)
CREAT SERPL-MCNC: 1 MG/DL (ref 0.7–1.3)
EOSINOPHIL NFR BLD: 2.3 % (ref 0–3)
ERYTHROCYTE [DISTWIDTH] IN BLOOD BY AUTOMATED COUNT: 13.6 % (ref 10.5–14.5)
GLUCOSE SERPL-MCNC: 103 MG/DL (ref 74–106)
GRANULOCYTES NFR BLD MANUAL: 68 % (ref 36–66)
HCT VFR BLD CALC: 35.5 % (ref 42–52)
HGB BLD-MCNC: 12.2 GM/DL (ref 14–18)
LYMPHOCYTES NFR BLD AUTO: 20.8 % (ref 24–44)
MAGNESIUM SERPL-MCNC: 1.6 MG/DL (ref 1.8–2.4)
MCH RBC QN AUTO: 31.6 PG (ref 26–34)
MCHC RBC AUTO-ENTMCNC: 34.4 G/DL (ref 28–37)
MCV RBC: 91.9 FL (ref 80–100)
MONOCYTES NFR BLD: 8.4 % (ref 1–8)
NEUTROPHILS # BLD: 5.9 THOU/UL (ref 1.4–8.2)
PLATELET # BLD: 289 THOU/UL (ref 150–400)
POTASSIUM SERPL-SCNC: 3.6 MMOL/L (ref 3.5–5.1)
RBC # BLD AUTO: 3.86 MIL/UL (ref 4.5–6)
SODIUM SERPL-SCNC: 145 MMOL/L (ref 136–145)
WBC # BLD AUTO: 8.6 THOU/UL (ref 4–11)

## 2021-06-07 NOTE — NUR
ASSUMED CARE AT 0700. SLEPT WELL. ALERT AND ORIENTATED X 4. FLAT AFFECT.
DENIES ANY PAIN OR DISCOMFORT. APPETITE GOOD, HAD A BM TODAY. ON ROOM AIR,
DENIES ANY SHORT OF AIR. PARTICIPATING IN THERAPY AND PROGRESSING TOWARDS
GOAL.

## 2021-06-07 NOTE — NUR
PT ALERT AND ORIENTED X 4.  SLIGHT WEAKNESS IN LEFT HAND.  PT C/O HEADACHE.
TYLENOL GIVEN AT HS. BED ALARM ON FOR SAFETY.  PT APPEARS TO BE SLEEPING ON
HOURLY ROUNDS.

## 2021-06-08 VITALS — DIASTOLIC BLOOD PRESSURE: 61 MMHG | SYSTOLIC BLOOD PRESSURE: 132 MMHG

## 2021-06-08 VITALS — DIASTOLIC BLOOD PRESSURE: 74 MMHG | SYSTOLIC BLOOD PRESSURE: 138 MMHG

## 2021-06-08 NOTE — NUR
FAXED REFERRAL TO [facility]. CONFIRMED WITH ZACH/INTAKE THAT
THEY PROVIDE SERVICES TO [place] ([facility]) AND THAT THEY CAN ACCEPT
THE PATIENT AT DISCHARGE. PATIENT ANTICIPATED DISCHARGE IS 6/11/21 AND
HOME HEALTH SERVICES NEEDED ARE NURSING, OT, PT, ST AND SOCIAL WORK.
[facility] P [phone]; [phone]

## 2021-06-08 NOTE — NUR
Team meeting, recommendation: moderate cognitive and sever memory deficits.
needs assist with medication. Neice to cont. support with finances. dc 6/11 hh
( pt, ot, st, nursing). has cane, no other dme needs. Neice to check in on
Bob at home.  life alert.  [facility], referral to be sent to [facility] to see if they
can accept for hh needs. deb hh doesn't go to his area in [place].

## 2021-06-08 NOTE — NUR
ASSUMED CARE OF PT AT 1925. PT IS A&OX3. IS ON ROOM AIR. NANCY PAIN. IS
STABLE. IS UP WITH 1 ASSIST, GB, CANE. FALL PRECAUTIONS & HOURLY ROUNDING
CONTINUED THIS SHIFT. IS ABLE TO TURN SELF IN BED. PT REFUSED TO CHANGE
CLOTHES IN TO PJS. EDUCATION PROVIDED. LABS & VITALS REVIEWED. PT IS CURRENTLY
IN BED ASLEEP. CALL LIGHT WITHIN REACH. WILL CONTINUE TO MONITOR.

## 2021-06-08 NOTE — NUR
PT ASSESSMENT COMPLETED AND VSS. MEDS GIVEN AS ORDERED AND WELL TOLERATED.
FALL PRECAUTIONS IN PLACE. PT UP TO THE BATHROOM WITH ASST/GAIT/WALKER.
STEADY. VOIDING MODERATE AMOUNT OF YELLOW URINE. PRN TYLENOL HELPFUL FOR
GENERALIZED PAIN. ASST WITH REPOSITION FOR COMFORT. SLEEPING WELL. WILL
CONTINUE TO MONITOR FREQUENTLY.

## 2021-06-08 NOTE — NUR
Nutrition followup: pt continues on rehab unit S/P CVA. ST following and pt
continues to require mechanically altered chopped diet/carb controlled. BG
controlled. Pt continues to report good appetite, variable amounts recorded
but overall > 60% of meals. BM 6/7. Vitamin D deficiency. On vitamin D, B12
and folic acid. No new weight to assess. Continue low nutrition risk.

## 2021-06-08 NOTE — NUR
ASSUMED CARE AT 0700. SLEPT WELL LAST NIGHT. ALERT AND ORIENTATED X 4. FLAT
AFFECT. DENIES ANY PAIN THIS MORNING. WAS GIVEN TYLENOL LAST NIGHT. APPETITE
GOOD, BLOOD SUGAR 114 ON METFORMIN. LAST BM 6/7. PARTICIPATING WITH THERAPY
AND PROGRESSING TOWARDS GOAL. CONT TO MONITOR.

## 2021-06-09 VITALS — SYSTOLIC BLOOD PRESSURE: 145 MMHG | DIASTOLIC BLOOD PRESSURE: 76 MMHG

## 2021-06-09 VITALS — SYSTOLIC BLOOD PRESSURE: 111 MMHG | DIASTOLIC BLOOD PRESSURE: 75 MMHG

## 2021-06-09 NOTE — NUR
PT FINISHED WITH PHYSICAL THERAPY. PT DID HAVE EPISODE OF DIZZINESS WHEN
WORKING ON THE BIKE, BP CHECKED AND /72, PULSE 80 AND SAT 93% ON ROOM
AIR. PT DID STATE HE FELT BETTER. PT LISINOPRIL HELD THIS AM. PT LUNGS CLEAR
AND ON ROOM AIR. PT UP WITH WALKER AND STAND-BY ASSIST. PT TOOK MEDS WHOLE
WITH WATER.

## 2021-06-09 NOTE — PLAN
[facility]
[address]   [address]                     REHAB UNIT PLAN OF CARE       
_______________________________________________________________________________
 
Name:       NAM GROSS              Room #:         514-P       ADM IN  
M.R.#:      [id number]                       Account #:      [id number]  
Admission:  05/29/21    Attend Phys:    Gomez López MD 
Discharge:              Date of Birth:  [birth year]  
                                                          Report #: [id number]
                                                                    [id number] 
_______________________________________________________________________________
THIS REPORT FOR:  
 
cc:  FAM - Family physician unknown
     FAM - Family physician unknown                                       
     Gomez López MD                                         ~
 
DOC #: [id number]
Gomez López MD
 
PROGRESS NOTE/OVERALL PLAN OF CARE
 
HISTORY OF PRESENT ILLNESS:  The patient is seen on the acute inpatient 
rehabilitation [facility]ina.  He is in no distress.  Temperature is 98.7, pulse 57, 
respirations 17, blood pressure 136/76.  He does have definite left-sided 
weakness, left upper extremity more than left lower extremity.  I would grade 
his left upper extremity strength, it is probably a grade 4 - to 3+/5, left 
lower extremity is more of a 4-/5.  He has decreased coordination with fine 
finger dexterity.  He has a left facial droop, otherwise contract with both 
eyes.  Functionally, he is transferring with min assist.  Gait is mod assist 100
feet with a front-wheeled walker.  In occupational therapy, upper body dressing 
is being evaluated with lower body dressing max assist.  Speech therapy reveals 
moderate cognitive deficits with severe memory deficits.  He is on a mechanical 
soft diet with thin liquids.
 
ASSESSMENT:
1.  Acute right frontal cerebrovascular accident with left hemiparesis.
2.  Right carotid artery occlusion.
3.  Left artery carotid stenosis.
4.  Bacteremia.
5.  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
6.  Hypertension.
7.  Diabetes mellitus type 2.
8.  Hyperlipidemia.
9.  Peripheral neuropathy.
10.  History of ethanol abuse, sober x1 year.
11.  Thrombocytopenia with hematology involved.
12.  Folic acid deficiency.
 
PLAN:
1.  The overall plan of care is based on the pre-admit screen and information 
garnered from therapy assessments.
2.  Estimated length of stay is probably at least 14 days.
3.  Medical prognosis is reasonably good.
4.  Anticipated interventions includes the interdisciplinary acute inpatient 
rehabilitation program.
5.  Anticipated functional outcomes would be for the patient to become maximally
 
 
 
[facility]
[address] [place]   [address]                     REHAB UNIT PLAN OF CARE       
_______________________________________________________________________________
 
Name:       NAM GROSS              Room #:         514-P       [facility] [place]  
[facility]#:      [id number]                       Account #:      [id number]  
Admission:  05/29/21    Attend Phys:    Gomez López MD 
Discharge:              Date of Birth:  [birth year]  
                                                          Report #: [id number]
                                                                    [id number] 
_______________________________________________________________________________
 
independent in basic transfers, mobility and ADLs as well as improvement in 
cognition and memory, so he can return back to the home setting.
6.  Discharge destination.  The patient does live in an apartment alone.  He has
a supportive niece in town who is his durable power of .
7.  Expected therapy by discipline includes PT, OT and speech 1 hour per day 
each five days a week throughout the duration of the acute inpatient 
rehabilitation stay.
 
MD GREGORIO [place]/PINO
 
D: 06/01/2021 11:50 AM
T: 06/01/2021 10:15 PM
 
 
 
 
 
 
 
 
 
 
 
 
 
 
 
 
 
 
 
 
 
 
 
 
 
 
 
 
 
 
  <ELECTRONICALLY SIGNED>
   By: Gomez López MD         
  06/09/21     1605
D: 06/01/21 1050                           _____________________________________
T: 06/01/21 2115                           Gomez López MD           /nt

## 2021-06-10 VITALS — SYSTOLIC BLOOD PRESSURE: 124 MMHG | DIASTOLIC BLOOD PRESSURE: 74 MMHG

## 2021-06-10 VITALS — DIASTOLIC BLOOD PRESSURE: 65 MMHG | SYSTOLIC BLOOD PRESSURE: 146 MMHG

## 2021-06-10 NOTE — NUR
PT ASSESSMENT COMPLETED AND VSS. MEDS GIVEN AS ORDERED AND WELL TOLERATED.
FALL PRECAUTIONS IN PLACE. UP TO THE BATHROOM WITH ASST/GAIT/WALKER. STEADY.
PT VERY EXCITED ABOUT GOING HOME IN 2 DAYS. PT DENIES NEEDS. SLEEPING WELL.
WILL CONTINUE TO MONITOR FREQUENTLY.

## 2021-06-10 NOTE — NUR
PT. A&OX4, PT WALKING IN HALLWAY WITH P.T. PT WILLINGLY WORKS WITH THERAPIES.
PT. IS UP AD ARTEMIO WITH CANE IN ROOM. PT. SHOULD DC TOMORROW. WILL CONTINUE TO
MONITOR

## 2021-06-11 VITALS — SYSTOLIC BLOOD PRESSURE: 128 MMHG | DIASTOLIC BLOOD PRESSURE: 46 MMHG

## 2021-06-11 VITALS — DIASTOLIC BLOOD PRESSURE: 46 MMHG | SYSTOLIC BLOOD PRESSURE: 128 MMHG

## 2021-06-11 NOTE — NUR
FAXED DISCHARGE ORDERS AND SUMMARY TO [facility].
FAXED DISCHARGE ORDERS AND SUMMARY TO [facility], ATT: TANIA.
WILL CONFIRM THEY RECEIVED.
[facility] P [phone], [phone]
[facility] P [phone]; [phone]

## 2021-06-11 NOTE — NUR
ASSUMEDE CARE OF PT THIS MORNING AT 0700 THIS MORNING. PT WAS A/OX4, EYES
PERRLA, LUNGS CLEAR, SKIN W/D/ATR, INTACT TENTING ABSENT. PT IS INDEPENDENT TO
RESTROOM WITH CANE. ASSESSMENTS OTHERWISE UNREMARKABLE. CALL LIGHT AND OTHER
NEEDS ARE WITHIN REACH. MEDS AND TX GIVEN AS NEEDED AND SCHEDULED. PT IS
SCHEDULED TO BE DISCHARGED AT APPROX. 1400 HOURS THIS AFTERNOON WHEN FAMILY
MEMBER ARRIVES. PT SIGNED DISCHARGE PAPERWORK AND GIVEN EDUCATION AND
DISCHARGE MATERIAL.

## 2021-06-11 NOTE — NUR
Provider plus will deliver cane prior to dc today, Cyrus going to [facility] in [place] with melissa rivera. DC orders to be faxed. Family 
between [phone] today.

## 2021-06-13 NOTE — HC
[facility]
[address]   [address]                     CONSULTATION                  
_______________________________________________________________________________
 
Name:       NAM GROSS              Room #:         514-P       [facility] [place]  
..#:      [id number]                       Account #:      [id number]  
Admission:  21    Attend Phys:    Gomez López MD 
Discharge:  21    Date of Birth:  [birth year]  
                                                          Report #: [id number]
                                                                    [id number] 
_______________________________________________________________________________
THIS REPORT FOR:  
 
cc:  FAM - Family physician unknown
     FAM - Family physician unknown                                       
     Tee Curry PhD                                           ~
 
DOC #: [id number]
Tee Curry, PhD
DATE OF SERVICE: 2021
 
NEUROBEHAVIORAL STATUS EXAM
 
ATTENDING PHYSICIAN:  Gomez López M.D.
 
CONSULTANT:  Tee Curry, PhD
 
CLINICAL PRESENTATION:  The patient is a 66-year-old male admitted to the 
hospital on 2021 as a transfer from the [facility].  He had 
symptoms that included left hemiparesis.  An MRI confirmed a right frontal CVA. 
The patient has a history of alcohol abuse.  
 
His assessment on admission to the rehab unit included an acute right frontal
cerebrovascular accident with left hemiparesis, right carotid artery occlusion,
left carotid artery stenosis, bacteremia, old right parietal CVA, hypertension,
diabetes type 2 with an A1c of 6.0, hyperlipidemia, peripheral neuropathy,
alcohol abuse, sober for 1 year and thrombocytopenia along with folic acid
deficiency.  
 
A complete description of his medical condition and history can be found in his
medical record.  Neuropsychological consultation was requested to provide
assistance in the assessment of cognitive and emotional status and to provide
recommendations and services.
 
Prior to this most recent admission, he was living independently in an 
apartment.  He is reported to require a cane as needed and was 
driving.  He has a supportive niece who is also his durable power of .  
Apparently, the patient has a wife that has a dementia and is living in 
[place].  The patient had one son that  from suicide.  History of alcohol 
abuse reported with a 12-month period of sobriety.  
 
The patient is a high school graduate.  He had worked in construction and for
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  Prior history of treatment for anxiety
and depression are reported including medication and counseling.  He indicates
having been independent with basic and instrumental activities of daily living.
 
TECHNIQUES UTILIZED:  Clinical interview, review of medical records, staff 
 
 
 
[facility]
[address] [place]   [address]                     CONSULTATION                  
_______________________________________________________________________________
 
Name:       NAM GROSS              Room #:         514-P       [facility] [place]  
[facility]#:      [id number]                       Account #:      [id number]  
Admission:  21    Attend Phys:    Gomez López MD 
Discharge:  21    Date of Birth:  [birth year]  
                                                          Report #: [id number]
                                                                    [id number] 
_______________________________________________________________________________
 
consultation and behavioral observation, mini mental status exam 2 standard 
version and clock drawing.
 
EXAMINATION FINDINGS:  The patient was alert and cooperative with the 
assessment.  He accurately described events surrounding his admission.  There is
no evidence of aphasia.  His thoughts were logical and goal oriented.  He does 
not report auditory or visual hallucinations.  He denies subjective anxiety or 
depression currently.  Symptoms include current tobacco use.  He continues to 
remain sober and does not report problems with sleep or appetite.
 
Performance on the MMSE 2 brief version is extremely low with a raw score of 10
and 16.  The patient was 3/3 for initial registration, 4/5 for orientation to
time, 3/5 for orientation to place and 0/3 for immediate recall of 3 items
after a brief time delay and distraction.  Performance improved on the MMSE 2
SV to a raw score of 22 of 30, which is a T score of 29 and percentile rank of
2.  He was 3/5 for serial sevens, 2/2 for naming, 1/1 for repetition, 3/3 for
comprehension.  He could read and follow a single command and write a sentence.
The patient was able to copy a simple geometric design.  Difficulty with visual
spatial construction is noted, likely secondary to longstanding essential
tremor.
 
The patient is presenting with impaired immediate recall and 
concentration.  Auditory comprehension appears satisfactory.  Speech therapy 
notes suggest severe deficits in memory and moderate in overall cognitive 
functioning.
 
DIAGNOSTIC IMPRESSION:
 
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
likely in the mild range 
 
Anxiety disorder with depression by history.
 
Alcohol use disorder - in sustained remission.
 
RECOMMENDATIONS:  The patient is likely to require increased assistance with 
activities of daily living upon discharge.  Assistance in the management of 
medication, finances and nutrition are recommended.  Driving should be 
discontinued until a more thorough evaluation of driving capacity can be 
determined.
 
Thank you very much for allowing me to provide the consultation on this patient.
 
Tee Curry, PhD
[facility]/ALL
 
 
 
[facility]
[address]
[place]   [address]                     CONSULTATION                  
_______________________________________________________________________________
 
Name:       NAM GROSS              Room #:         514-P       [facility] [place]  
.R.#:      [id number]                       Account #:      [id number]  
Admission:  21    Attend Phys:    Gomez López MD 
Discharge:  21    Date of Birth:  [birth year]  
                                                          Report #: [id number]
                                                                    [id number] 
_______________________________________________________________________________
 
 
D: 2021 10:59 AM
T: 2021 09:52 PM
 
 
 
 
 
 
 
 
 
 
 
 
 
 
 
 
 
 
 
 
 
 
 
 
 
 
 
 
 
 
 
 
 
 
 
 
 
 
 
 
  <ELECTRONICALLY SIGNED>
   By: Tee Curry, PhD           
  21     1519
D: 21 0959                           _____________________________________
T: 21                           Tee Curry, PhD             /nt